# Patient Record
Sex: MALE | Race: WHITE | Employment: UNEMPLOYED | URBAN - METROPOLITAN AREA
[De-identification: names, ages, dates, MRNs, and addresses within clinical notes are randomized per-mention and may not be internally consistent; named-entity substitution may affect disease eponyms.]

---

## 2018-04-27 ENCOUNTER — HOSPITAL ENCOUNTER (INPATIENT)
Age: 53
LOS: 3 days | Discharge: LEFT BEFORE DISCHARGE | DRG: 885 | End: 2018-04-30
Attending: PSYCHIATRY & NEUROLOGY | Admitting: PSYCHIATRY & NEUROLOGY
Payer: MEDICARE

## 2018-04-27 PROBLEM — F20.9 SCHIZOPHRENIA (HCC): Status: ACTIVE | Noted: 2018-04-27

## 2018-04-27 PROCEDURE — 65220000003 HC RM SEMIPRIVATE PSYCH

## 2018-04-27 RX ORDER — OLANZAPINE 5 MG/1
5 TABLET ORAL
Status: DISCONTINUED | OUTPATIENT
Start: 2018-04-27 | End: 2018-04-30 | Stop reason: HOSPADM

## 2018-04-27 RX ORDER — BENZTROPINE MESYLATE 1 MG/ML
2 INJECTION INTRAMUSCULAR; INTRAVENOUS
Status: DISCONTINUED | OUTPATIENT
Start: 2018-04-27 | End: 2018-04-30 | Stop reason: HOSPADM

## 2018-04-27 RX ORDER — ZOLPIDEM TARTRATE 10 MG/1
10 TABLET ORAL
Status: DISCONTINUED | OUTPATIENT
Start: 2018-04-27 | End: 2018-04-30 | Stop reason: HOSPADM

## 2018-04-27 RX ORDER — BENZTROPINE MESYLATE 2 MG/1
2 TABLET ORAL
Status: DISCONTINUED | OUTPATIENT
Start: 2018-04-27 | End: 2018-04-30 | Stop reason: HOSPADM

## 2018-04-27 RX ORDER — ADHESIVE BANDAGE
30 BANDAGE TOPICAL DAILY PRN
Status: DISCONTINUED | OUTPATIENT
Start: 2018-04-27 | End: 2018-04-30 | Stop reason: HOSPADM

## 2018-04-27 RX ORDER — IBUPROFEN 400 MG/1
400 TABLET ORAL
Status: DISCONTINUED | OUTPATIENT
Start: 2018-04-27 | End: 2018-04-30 | Stop reason: HOSPADM

## 2018-04-27 RX ORDER — LORAZEPAM 1 MG/1
1 TABLET ORAL
Status: DISCONTINUED | OUTPATIENT
Start: 2018-04-27 | End: 2018-04-30 | Stop reason: HOSPADM

## 2018-04-27 RX ORDER — IBUPROFEN 200 MG
1 TABLET ORAL
Status: DISCONTINUED | OUTPATIENT
Start: 2018-04-27 | End: 2018-04-30 | Stop reason: HOSPADM

## 2018-04-27 RX ORDER — LORAZEPAM 2 MG/ML
2 INJECTION INTRAMUSCULAR
Status: DISCONTINUED | OUTPATIENT
Start: 2018-04-27 | End: 2018-04-30 | Stop reason: HOSPADM

## 2018-04-27 RX ORDER — ACETAMINOPHEN 325 MG/1
650 TABLET ORAL
Status: DISCONTINUED | OUTPATIENT
Start: 2018-04-27 | End: 2018-04-30 | Stop reason: HOSPADM

## 2018-04-27 NOTE — IP AVS SNAPSHOT
Summary of Care Report The Summary of Care report has been created to help improve care coordination. Users with access to [email or web address] or [address] (Web-based application) may access additional patient information including the Discharge Summary. If you are not currently a [address] user and need more information, please call the number listed below in the [place] [address] section and ask to be connected with Medical Records. Facility Information Name Address Phone [address] [place] [phone] Patient Information Patient Name Sex SUMANTH Valentin ([id number]) Male 1965 Discharge Information Admitting Provider Service Area Unit Radha Milton MD / [address] 3 Acute [place] / [phone] Discharge Provider Discharge Date/Time Discharge Disposition Destination (none) 2018 Afternoon (Pending) AHR (none) Patient Language Language ENGLISH [13] Hospital Problems as of 2018  Never Reviewed Class Noted - Resolved Last Modified POA Active Problems * (Principal)Schizophrenia, paranoid ([facility] [address]  2018 - Present 2018 by Tej Baldwin MD Unknown Entered by Tej Baldwin MD  
  Schizophrenia [facility])  2018 - Present 2018 by Radha Milton MD Unknown Entered by Radha Milton MD  
  
You are allergic to the following No active allergies Current Discharge Medication List  
  
Notice You have not been prescribed any medications. Follow-up Information Follow up With Details Comments Contact Info Julieth Perez today Intake appointment for services.   Due to the limited slots, you may want to arrive by 7:00am. The doors open at 7:30am and you should sign in at the registration desk. [address] [address] Hours: Monday-Friday 8:30am- 12:00PM & 1:00PM-4:30pm  
[phone] (phone) [phone] (fax) 9089 [facility] in 1 week The resource center will provide case management and outreach to homeless individuals. Address: [address] [place] [address],[address] Floor, [place] [address] [address] Phone: [phone] None   None (395) Patient stated that they have no PCP Discharge Instructions DISCHARGE SUMMARY from Nurse PATIENT INSTRUCTIONS: 
What to do at Home: 
Recommended activity: Activity as tolerated *  Please give a list of your current medications to your Primary Care Provider. *  Please update this list whenever your medications are discontinued, doses are 
    changed, or new medications (including over-the-counter products) are added. *  Please carry medication information at all times in case of emergency situations. These are general instructions for a healthy lifestyle: No smoking/ No tobacco products/ Avoid exposure to second hand smoke Surgeon General's Warning:  Quitting smoking now greatly reduces serious risk to your health. Obesity, smoking, and sedentary lifestyle greatly increases your risk for illness A healthy diet, regular physical exercise & weight monitoring are important for maintaining a healthy lifestyle The discharge information has been reviewed with the patient. The patient verbalized understanding. Discharge medications reviewed with the patient and appropriate educational materials and side effects teaching were provided. ___________________________________________________________________________________________________________________________________ If I feel I am at risk of hurting myself or others, I will call the crisis office and speak with a crisis worker who will assist me during my crisis. [address]  [phone] [address] [place] [address] [phone] [facility] 351  [phone] [employer] 72- [phone] [employer]-  [phone] 9 [facility]  [phone] [facility]6 [facility]  [phone] Chart Review Routing History No Routing History on File

## 2018-04-27 NOTE — IP AVS SNAPSHOT
Emilee Wingby 
 
 
 [address] Patient: Tigist Henson MRN: [id number] :1965 About your hospitalization You were admitted on:  2018 You last received care in the:  [address] [place] [address] Gerald You were discharged on:  2018 Why you were hospitalized Your primary diagnosis was:  Schizophrenia, Paranoid (Hcc) Your diagnoses also included:  Schizophrenia (Hcc) Follow-up Information Follow up With Details Comments Contact Info Julieth Perez today Intake appointment for services. Due to the limited slots, you may want to arrive by 7:00am. The doors open at 7:30am and you should sign in at the registration desk. [address] [address] Hours: Monday-Friday 8:30am- 12:00PM & 1:00PM-4:30pm  
[phone] (phone) [phone] (fax) 1104 [facility] in 1 week The resource center will provide case management and outreach to homeless individuals. Address: [address] [place] [address],[place] [address] [address] Phone: [phone] None   None (395) Patient stated that they have no PCP Discharge Orders None A check yumiko indicates which time of day the medication should be taken. My Medications Notice You have not been prescribed any medications. Discharge Instructions DISCHARGE SUMMARY from Nurse PATIENT INSTRUCTIONS: 
What to do at Home: 
Recommended activity: Activity as tolerated *  Please give a list of your current medications to your Primary Care Provider. *  Please update this list whenever your medications are discontinued, doses are 
    changed, or new medications (including over-the-counter products) are added. *  Please carry medication information at all times in case of emergency situations. These are general instructions for a healthy lifestyle: No smoking/ No tobacco products/ Avoid exposure to second hand smoke Surgeon General's Warning:  Quitting smoking now greatly reduces serious risk to your health. Obesity, smoking, and sedentary lifestyle greatly increases your risk for illness A healthy diet, regular physical exercise & weight monitoring are important for maintaining a healthy lifestyle The discharge information has been reviewed with the patient. The patient verbalized understanding. Discharge medications reviewed with the patient and appropriate educational materials and side effects teaching were provided. ___________________________________________________________________________________________________________________________________ If I feel I am at risk of hurting myself or others, I will call the crisis office and speak with a crisis worker who will assist me during my crisis. [place]  [phone] [address] [place] [address] [phone] [place] [address]  [phone] [employer] [address]-   [phone] [employer]-  [phone] 9 [facility]  [phone] [address] [place] [address]  [phone] Introducing [facility]! Janna Astudillo introduces [employer] patient portal. Now you can access parts of your medical record, email your doctor's office, and request medication refills online. 1. In your internet browser, go to [email or web address] 2. Click on the First Time User? Click Here link in the Sign In box. You will see the New Member Sign Up page. 3. Enter your [employer] Access Code exactly as it appears below. You will not need to use this code after youve completed the sign-up process. If you do not sign up before the expiration date, you must request a new code. · [employer] Access Code: [email or web address] Expires: 7/29/2018  1:35 PM 
 
4. Enter the last four digits of your Social Security Number (xxxx) and Date of Birth (mm/dd/yyyy) as indicated and click Submit.  You will be taken to the next sign-up page. 5. Create a Or[employer]ot ID. This will be your [employer] login ID and cannot be changed, so think of one that is secure and easy to remember. 6. Create a [employer] password. You can change your password at any time. 7. Enter your Password Reset Question and Answer. This can be used at a later time if you forget your password. 8. Enter your e-mail address. You will receive e-mail notification when new information is available in [facility] 9. Click Sign Up. You can now view and download portions of your medical record. 10. Click the Download Summary menu link to download a portable copy of your medical information. If you have questions, please visit the Frequently Asked Questions section of the [employer] website. Remember, [employer] is NOT to be used for urgent needs. For medical emergencies, dial 911. Now available from your iPhone and Android! Introducing Leif Kumar As a [facility] patient, I wanted to make you aware of our electronic visit tool called Leif Kumar. [employer] [address] 24/7 allows you to connect within minutes with a medical provider 24 hours a day, seven days a week via a mobile device or tablet or logging into a secure website from your computer. You can access Leif Kumar from anywhere in the [place]. A virtual visit might be right for you when you have a simple condition and feel like you just dont want to get out of bed, or cant get away from work for an appointment, when your regular [facility] provider is not available (evenings, weekends or holidays), or when youre out of town and need minor care. Electronic visits cost only $49 and if the [facility] 24/7 provider determines a prescription is needed to treat your condition, one can be electronically transmitted to a nearby pharmacy*. Please take a moment to enroll today if you have not already done so.   The enrollment process is free and takes just a few minutes. To enroll, please download the [employer] 24/7 caity to your tablet or phone, or visit [email or web address] to enroll on your computer. And, as an [address] [place] [address] patient with a [employer] account, the results of your visits will be scanned into your electronic medical record and your primary care provider will be able to view the scanned results. We urge you to continue to see your regular [employer] provider for your ongoing medical care. And while your primary care provider may not be the one available when you seek a [employer] virtual visit, the peace of mind you get from getting a real diagnosis real time can be priceless. For more information on [employer], view our Frequently Asked Questions (FAQs) at [email or web address]. Sincerely, 
 
Liz Huitron MD 
Chief Medical Officer Eve Narcisa Shaver *:  certain medications cannot be prescribed via [employer] Providers Seen During Your Hospitalization Provider Specialty Primary office phone Taylor Dawkins MD Psychiatry [phone] Your Primary Care Physician (PCP) Primary Care Physician Office Phone Office Fax NONE ** None ** ** None ** You are allergic to the following No active allergies Emergency Contacts Name Discharge Info Relation Home Work Mobile No,One N/A  AT THIS TIME [6] Patient [10] [phone] Patient Belongings The following personal items are in your possession at time of discharge: 
  Dental Appliances: None  Visual Aid: None      Home Medications: None   Jewelry: None  Clothing: Belt, Footwear, Gloves, Jacket/Coat, Pants, Shirt, Socks, Sweater    Other Valuables: Lighter/matches, Money (comment), Pocket knife, Other (comment) (12 cents, 2 Visa debit cards, 3 desi packs, flashlights)  Personal Items Sent to Safe: see valuables card Please provide this summary of care documentation to your next provider. Signatures-by signing, you are acknowledging that this After Visit Summary has been reviewed with you and you have received a copy. Patient Signature:  ____________________________________________________________ Date:  ____________________________________________________________  
  
Shanta Lambing Provider Signature:  ____________________________________________________________ Date:  ____________________________________________________________

## 2018-04-27 NOTE — IP AVS SNAPSHOT
[facility] 
 
 
 [address] Patient: Randy Stewart MRN: [id number] :1965 A check yumiko indicates which time of day the medication should be taken. My Medications Notice You have not been prescribed any medications.

## 2018-04-28 PROBLEM — F20.0 SCHIZOPHRENIA, PARANOID (HCC): Status: ACTIVE | Noted: 2018-04-27

## 2018-04-28 PROCEDURE — 65220000003 HC RM SEMIPRIVATE PSYCH

## 2018-04-28 PROCEDURE — 74011250637 HC RX REV CODE- 250/637: Performed by: FAMILY MEDICINE

## 2018-04-28 RX ORDER — AMLODIPINE BESYLATE 5 MG/1
5 TABLET ORAL DAILY
Status: DISCONTINUED | OUTPATIENT
Start: 2018-04-29 | End: 2018-04-30 | Stop reason: HOSPADM

## 2018-04-28 RX ORDER — AMLODIPINE BESYLATE 5 MG/1
5 TABLET ORAL ONCE
Status: COMPLETED | OUTPATIENT
Start: 2018-04-28 | End: 2018-04-28

## 2018-04-28 RX ADMIN — AMLODIPINE BESYLATE 5 MG: 5 TABLET ORAL at 14:05

## 2018-04-28 NOTE — BH NOTES
GROUP THERAPY PROGRESS NOTE    Bernadine Garcia is participating in [employer].      Group time: 30 minutes    Personal goal for participation:  Unit orientation    Goal orientation: Community    Group therapy participation: active    Therapeutic interventions reviewed and discussed: Yes    Impression of participation: good

## 2018-04-28 NOTE — PROGRESS NOTES
Problem: Altered Thought Process (Adult/Pediatric)  Goal: *STG: Participates in treatment plan  Outcome: Progressing Towards Goal  Pt is visible on the unit. Observed pt responding loudly in his room. Pt blood pressure has been high, called Dr. Jamari Vasquez re the BP and new orders was given. Pt participated in rounds with MD and nurse and became agitated but eventually calmed down. Pt denies any needs at this time. Will continue to monitor pt q15 minutes for safety and support.

## 2018-04-28 NOTE — BH NOTES
LEISURE GROUP THERAPY PROGRESS NOTE    The patient Tea falcon 50 y.o. male is participating in Leisure Group. Group time: 45 minutes    Personal goal for participation: Daily social interactions with other peers & staff.     Goal orientation: Relaxation activities    Group therapy participation: active    Therapeutic interventions reviewed and discussed:  Yes    Impression of participation: Ubaldo Fleischer  4/28/2018  1:17 PM

## 2018-04-28 NOTE — H&P
INITIAL PSYCHIATRIC EVALUATION            IDENTIFICATION:    Patient Name  Malcom Leroy   Date of Birth 1965   [facility] [id number]   Medical Record Number  [id number]      Age  50 y.o. PCP None   Admit date:  4/27/2018    Room Number  308/01  @ [facility]   Date of Service  4/28/2018            HISTORY         REASON FOR HOSPITALIZATION:  CC: \"I would prefer not to have a mental illness doctor service from this place\". Pt admitted under a temporary prison order (TDO) voluntary basis for severe psychosis and melany proving to be an imminent danger to self and others and an inability to care for self. HISTORY OF PRESENT ILLNESS:    The patient, Malcom Leroy, is a 50 y.o. WHITE [place]  male with a past psychiatric history significant for chronic mental illness with diagnosis of Schizophrenia chronic paranoid type, who presents at this time with complaints of (and/or evidence of) the following emotional symptoms: agitation, paranoid behavior and psychosis. poor hygiene,homeless, travelling from state to state. Additional symptomatology include agitation, anger outbursts and problem with medication. The above symptoms have been present for years,worse since last few days. These symptoms are of severe severity. These symptoms are *constant  in nature. The patient's condition has been precipitated by and psychosocial stressors (homeless, poor support  ). Patient's condition made worse by treatment noncompliance. UDS:negative; BAL=0. ALLERGIES: Allergies no known allergies   MEDICATIONS PRIOR TO ADMISSION:   No prescriptions prior to admission. PAST MEDICAL HISTORY:   No past medical history on file. No past surgical history on file. SOCIAL HISTORY:    Social History     Social History    Marital status: SINGLE     Spouse name: N/A    Number of children: N/A    Years of education: N/A     Occupational History    Not on file.      Social History Main Topics    Smoking status: Not on file    Smokeless tobacco: Not on file    Alcohol use Not on file    Drug use: Not on file    Sexual activity: Not on file     Other Topics Concern    Not on file     Social History Narrative      FAMILY HISTORY: History reviewed. No pertinent family history. No family history on file. REVIEW OF SYSTEMS:   Psychological ROS: positive for - mood swings and psychosis   Respiratory ROS: no cough, shortness of breath, or wheezing  Cardiovascular ROS: no chest pain or dyspnea on exertion  Pertinent items are noted in the History of Present Illness. All other Systems reviewed and are considered negative. MENTAL STATUS EXAM & VITALS     MENTAL STATUS EXAM (MSE):    MSE FINDINGS ARE WITHIN NORMAL LIMITS (WNL) UNLESS OTHERWISE STATED BELOW. ( ALL OF THE BELOW CATEGORIES OF THE MSE HAVE BEEN REVIEWED (reviewed 4/28/2018) AND UPDATED AS DEEMED APPROPRIATE )  General Presentation age appropriate, casually dressed, disheveled and poor hygiene, guarded and uncooperative   Orientation oriented to time, place and person   Vital Signs  See below (reviewed 4/28/2018); Vital Signs (BP, Pulse, & Temp) are within normal limits if not listed below.    Gait and Station Stable/steady, no ataxia   Musculoskeletal System No extrapyramidal symptoms (EPS); no abnormal muscular movements or Tardive Dyskinesia (TD); muscle strength and tone are within normal limits   Language No aphasia or dysarthria   Speech:  hyperverbal   Thought Processes illogical; fast rate of thoughts; poor abstract reasoning/computation   Thought Associations loose associations and tangential   Thought Content paranoid delusions   Suicidal Ideations none   Homicidal Ideations none   Mood:  angry, irritable and labile    Affect:  anxious, irritable and labile   Memory recent  intact   Memory remote:  intact   Concentration/Attention:  intact   Fund of Knowledge average   Insight:  poor   Reliability poor   Judgment:  poor VITALS:     Patient Vitals for the past 24 hrs:   Temp Pulse Resp BP SpO2   04/28/18 1210 97.2 °F (36.2 °C) (!) 58 16 (!) 151/110 -   04/28/18 1203 - - - (!) 151/110 -   04/28/18 0616 - 64 - (!) 145/105 -   04/27/18 2111 - 81 - 123/88 -   04/27/18 2013 98.1 °F (36.7 °C) 74 18 (!) 138/117 95 %     Wt Readings from Last 3 Encounters:   No data found for Wt     Temp Readings from Last 3 Encounters:   04/28/18 97.2 °F (36.2 °C)     BP Readings from Last 3 Encounters:   04/28/18 (!) 151/110     Pulse Readings from Last 3 Encounters:   04/28/18 (!) 58            DATA     LABORATORY DATA:  Labs Reviewed - No data to display  No results found for any previous visit. RADIOLOGY REPORTS:  No results found for this or any previous visit. No results found.            MEDICATIONS       ALL MEDICATIONS  Current Facility-Administered Medications   Medication Dose Route Frequency    ziprasidone (GEODON) 20 mg in sterile water (preservative free) 1 mL injection  20 mg IntraMUSCular BID PRN    OLANZapine (ZyPREXA) tablet 5 mg  5 mg Oral Q6H PRN    benztropine (COGENTIN) tablet 2 mg  2 mg Oral BID PRN    benztropine (COGENTIN) injection 2 mg  2 mg IntraMUSCular BID PRN    LORazepam (ATIVAN) injection 2 mg  2 mg IntraMUSCular Q4H PRN    LORazepam (ATIVAN) tablet 1 mg  1 mg Oral Q4H PRN    zolpidem (AMBIEN) tablet 10 mg  10 mg Oral QHS PRN    acetaminophen (TYLENOL) tablet 650 mg  650 mg Oral Q4H PRN    ibuprofen (MOTRIN) tablet 400 mg  400 mg Oral Q8H PRN    magnesium hydroxide (MILK OF MAGNESIA) 400 mg/5 mL oral suspension 30 mL  30 mL Oral DAILY PRN    nicotine (NICODERM CQ) 21 mg/24 hr patch 1 Patch  1 Patch TransDERmal DAILY PRN      SCHEDULED MEDICATIONS  Current Facility-Administered Medications   Medication Dose Route Frequency                ASSESSMENT & PLAN        The patient, Blake Cunningham, is a 50 y.o.  male who presents at this time for treatment of the following diagnoses:  Patient Active Hospital Problem List:   Schizophrenia, paranoid ([facility].) (4/27/2018)    Assessment: Chronic history with acute exacerbation,present with psychosis, paranoia,hyperverbal,delusional, disorganized thoughts process,homeless,internally preoccupied,poor hygiene. Poor insight. wanderer,travelling from state to state,not on any medications and does note want to be on any medications. Plan: Use prn antipsychotic and benzo for psychotic agitations,aggression              Request court order medications. I will continue to monitor blood levels (Depakote, Tegretol, lithium, clozapine---a drug with a narrow therapeutic index= NTI) and associated labs for drug therapy implemented that require intense monitoring for toxicity as deemed appropriate based on current medication side effects and pharmacodynamically determined drug 1/2 lives. A coordinated, multidisplinary treatment team (includes the nurse, unit pharmcist,  and writer) round was conducted for this initial evaluation with the patient present. The following regarding medications was addressed during rounds with patient:   the risks and benefits of the proposed medication. The patient was given the opportunity to ask questions. Informed consent given to the use of the above medications. I will continue to adjust psychiatric and non-psychiatric medications (see above \"medication\" section and orders section for details) as deemed appropriate & based upon diagnoses and response to treatment. I have reviewed admission (and previous/old) labs and medical tests in the EHR and or transferring hospital documents. I will continue to order blood tests/labs and diagnostic tests as deemed appropriate and review results as they become available (see orders for details). I have reviewed old psychiatric and medical records available in the EHR.  I Will order additional psychiatric records from other institutions to further elucidate the nature of patient's psychopathology and review once available. I will gather additional collateral information from friends, family and o/p treatment team to further elucidate the nature of patient's psychopathology and baselline level of psychiatric functioning.       ESTIMATED LENGTH OF STAY:    TBA        STRENGTHS:  Exercising self-direction/Resourceful                                        SIGNED:    Kathie Marks MD  4/28/2018

## 2018-04-28 NOTE — BH NOTES
Pt is a 50 yrs old male admitted on TDO, medically cleared at [facility]. Reportedly [place] police were called due to  pt was yelling at people and cars outside a store. Pt had history of Schizophrenia, not taking his medication and unable to care of himself, not showered in 2 weeks, not eating and is homeless. Pt said that he is from [place] has been travelling to [place] and he came to [facility] just to travel. UDS and BAL negative. On admission pt is uncooperative, very hesitant to share his mental health treatment, he said \" I don't want you all to  me based on my information. He is with poor hygiene, pressured speech, loud, refusing to take any medication, admits hearing voices, denies SI / HI and contracted for safety. Body search done by Imtiaz ALYT, Gracie ALYT and writer is unremarkable. Q 15 minutes monitoring initiated for the safety.

## 2018-04-29 LAB
CHOLEST SERPL-MCNC: 113 MG/DL
HDLC SERPL-MCNC: 45 MG/DL
HDLC SERPL: 2.5 {RATIO} (ref 0–5)
LDLC SERPL CALC-MCNC: 58.2 MG/DL (ref 0–100)
LIPID PROFILE,FLP: NORMAL
TRIGL SERPL-MCNC: 49 MG/DL (ref ?–150)
TSH SERPL DL<=0.05 MIU/L-ACNC: 1.02 UIU/ML (ref 0.36–3.74)
VLDLC SERPL CALC-MCNC: 9.8 MG/DL

## 2018-04-29 PROCEDURE — 65220000001 HC RM PRIVATE PSYCH

## 2018-04-29 PROCEDURE — 36415 COLL VENOUS BLD VENIPUNCTURE: CPT

## 2018-04-29 PROCEDURE — 80061 LIPID PANEL: CPT

## 2018-04-29 PROCEDURE — 84443 ASSAY THYROID STIM HORMONE: CPT

## 2018-04-29 PROCEDURE — 74011250637 HC RX REV CODE- 250/637: Performed by: FAMILY MEDICINE

## 2018-04-29 RX ADMIN — AMLODIPINE BESYLATE 5 MG: 5 TABLET ORAL at 08:51

## 2018-04-29 NOTE — BH NOTES
Pt spent the shift in room at times responding loudly to internal stimuli. Pt is meal complaint, however, he states he does not need medication. The med nursed encouraged him to be compliant and was successful. Pt express his feelings about being in the hospital, stating he wants to live his life the way he chooses and, the hospital is not his choice. Pt will continue to be monitored for safety.

## 2018-04-29 NOTE — CONSULTS
Medical Consult for [facility] Patient    Consult H&P   dictated, see patient chart    Impression:    Roland Fierro a 50 y.o. male with past medical history of schizophrenia presents with behavioral health problems of psychosis was admitted for further psychiatric evaluation and treatment. Plan:   1. Psychiatry to manage mental health issues  2. Continuing home medications once confirmed by nursing. 3. Pt bp is noted to elevated. Will treat accordingling  4. Otherwise, medically stable at this time, will follow up as needed. 5. No VTE prophylaxis indicated or necessary at this time.      Thank you  Brigid Rosa MD  4/29/2018, 6:51 AM

## 2018-04-29 NOTE — PROGRESS NOTES
PSYCHIATRIC PROGRESS NOTE         Patient Name  Bartolome Fisher   Date of Birth 1965   [facility] [id number]   Medical Record Number  [id number]      Age  50 y.o. PCP None   Admit date:  4/27/2018    Room Number  307/01  @ [facility]   Date of Service  4/29/2018          PSYCHOTHERAPY SESSION NOTE:  Length of psychotherapy session: 15 minutes    Main condition/diagnosis/issues treated during session today, 4/29/2018 : compliance     I employed Cognitive Behavioral therapy techniques, Reality-Oriented psychotherapy, as well as supportive psychotherapy in regards to various ongoing psychosocial stressors, including the following: pre-admission and current problems; housing issues; occupational issues; academic issues; legal issues; medical issues; and stress of hospitalization. Interpersonal relationship issues and psychodynamic conflicts explored. Attempts made to alleviate maladaptive patterns. We, also, worked on issues of denial & effects of substance dependency/use     Overall, patient is not progressing    Treatment Plan Update (reviewed an updated 4/29/2018) : I will modify psychotherapy tx plan by implementing more stress management strategies, building upon cognitive behavioral techniques, increasing coping skills, as well as shoring up psychological defenses). An extended energy and skill set was needed to engage pt in psychotherapy due to some of the following: resistiveness, complexity, negativity, confrontational nature, hostile behaviors, and/or severe abnormalities in thought processes/psychosis resulting in the loss of expressive/receptive language communication skills. E & M PROGRESS NOTE:         HISTORY         HISTORY OF PRESENT ILLNESS/INTERVAL HISTORY:  (reviewed/updated 4/29/2018). CC: \"I would prefer not to have a mental illness doctor service from this place\".  Pt admitted under a temporary half-way order (TDO) voluntary basis for severe psychosis and melany proving to be an imminent danger to self and others and an inability to care for self. HISTORY OF PRESENT ILLNESS:    The patient, Tigist Henson, is a 50 y.o. WHITE [place]  male with a past psychiatric history significant for chronic mental illness with diagnosis of Schizophrenia chronic paranoid type, who presents at this time with complaints of (and/or evidence of) the following emotional symptoms: agitation, paranoid behavior and psychosis. poor hygiene,homeless, travelling from [facility] to [facility]. Additional symptomatology include agitation, anger outbursts and problem with medication. The above symptoms have been present for years,worse since last few days. These symptoms are of severe severity. These symptoms are *constant  in nature. The patient's condition has been precipitated by and psychosocial stressors (homeless, poor support  ). Patient's condition made worse by treatment noncompliance. UDS:negative; BAL=0. Tigist Henson presents/reports/evidences the following emotional symptoms today, 4/29/2018:psychosis. The above symptoms have been present for years . These symptoms are of moderate in severity. The symptoms are intermittent/ fleeting in nature. Additional symptomatology and features include paranoid, psychotic,noncomplaint with medications, gets upset when medications discussed, poor insight,he is a wanderer, homeless,travel state to [facility]. Since admission he has not received no prn medications. He believes that he is doing fine for years and does not want to take medications. SIDE EFFECTS: (reviewed/updated 4/29/2018)  None reported or admitted to. No noted toxicity with use of Depakote/Tegretol/lithium/Clozaril/TCAs   ALLERGIES:(reviewed/updated 4/29/2018)  No Known Allergies   MEDICATIONS PRIOR TO ADMISSION:(reviewed/updated 4/29/2018)  No prescriptions prior to admission.       PAST MEDICAL HISTORY: Past medical history from the initial psychiatric evaluation has been reviewed (reviewed/updated 4/29/2018) with no additional updates (I asked patient and no additional past medical history provided). No past medical history on file. No past surgical history on file. SOCIAL HISTORY: Social history from the initial psychiatric evaluation has been reviewed (reviewed/updated 4/29/2018) with no additional updates (I asked patient and no additional social history provided). Social History     Social History    Marital status: SINGLE     Spouse name: N/A    Number of children: N/A    Years of education: N/A     Occupational History    Not on file. Social History Main Topics    Smoking status: Not on file    Smokeless tobacco: Not on file    Alcohol use Not on file    Drug use: Not on file    Sexual activity: Not on file     Other Topics Concern    Not on file     Social History Narrative      FAMILY HISTORY: Family history from the initial psychiatric evaluation has been reviewed (reviewed/updated 4/29/2018) with no additional updates (I asked patient and no additional family history provided). No family history on file. REVIEW OF SYSTEMS: (reviewed/updated 4/29/2018)  Appetite:improved   Sleep: good   All other Review of Systems: Psychological ROS: positive for - mood swings and Psychosis   Respiratory ROS: no cough, shortness of breath, or wheezing  Cardiovascular ROS: no chest pain or dyspnea on exertion         [address] (MSE):    MSE FINDINGS ARE WITHIN NORMAL LIMITS (WNL) UNLESS OTHERWISE STATED BELOW. ( ALL OF THE BELOW CATEGORIES OF THE MSE HAVE BEEN REVIEWED (reviewed 4/29/2018) AND UPDATED AS DEEMED APPROPRIATE )  General Presentation age appropriate and casually dressed, guarded and uncooperative   Orientation oriented to time, place and person   Vital Signs  See below (reviewed 4/29/2018); Vital Signs (BP, Pulse, & Temp) are within normal limits if not listed below.    Gait and Station [place], no ataxia   Musculoskeletal System No extrapyramidal symptoms (EPS); no abnormal muscular movements or Tardive Dyskinesia (TD); muscle strength and tone are within normal limits   Language No aphasia or dysarthria   Speech:  normal pitch and normal volume   Thought Processes illogical; fast rate of thoughts; poor abstract reasoning/computation   Thought Associations tangential   Thought Content paranoid delusions   Suicidal Ideations none   Homicidal Ideations none   Mood:  irritable and labile    Affect:  hostile, irritable and labile   Memory recent  intact   Memory remote:  intact   Concentration/Attention:  hypervigilance   Fund of Knowledge average   Insight:  poor   Reliability poor   Judgment:  poor          VITALS:     Patient Vitals for the past 24 hrs:   Temp Pulse Resp BP   04/29/18 1241 98.1 °F (36.7 °C) 72 16 121/85   04/29/18 0600 98.1 °F (36.7 °C) (!) 59 18 136/85   04/28/18 1817 97.9 °F (36.6 °C) 64 16 (!) 140/98     Wt Readings from Last 3 Encounters:   No data found for Wt     Temp Readings from Last 3 Encounters:   04/29/18 98.1 °F (36.7 °C)     BP Readings from Last 3 Encounters:   04/29/18 121/85     Pulse Readings from Last 3 Encounters:   04/29/18 72            DATA     LABORATORY DATA:(reviewed/updated 4/29/2018)  Recent Results (from the past 24 hour(s))   LIPID PANEL    Collection Time: 04/29/18  4:40 AM   Result Value Ref Range    LIPID PROFILE          Cholesterol, total 113 <200 MG/DL    Triglyceride 49 <150 MG/DL    HDL Cholesterol 45 MG/DL    LDL, calculated 58.2 0 - 100 MG/DL    VLDL, calculated 9.8 MG/DL    CHOL/HDL Ratio 2.5 0 - 5.0     TSH 3RD GENERATION    Collection Time: 04/29/18  4:40 AM   Result Value Ref Range    TSH 1.02 0.36 - 3.74 uIU/mL     No results found for: VALF2, VALAC, VALP, VALPR, DS6, CRBAM, CRBAMP, CARB2, XCRBAM  No results found for: LITHM   RADIOLOGY REPORTS:(reviewed/updated 4/29/2018)  No results found.        MEDICATIONS     ALL MEDICATIONS:   Current Facility-Administered Medications   Medication Dose Route Frequency    amLODIPine (NORVASC) tablet 5 mg  5 mg Oral DAILY    ziprasidone (GEODON) 20 mg in sterile water (preservative free) 1 mL injection  20 mg IntraMUSCular BID PRN    OLANZapine (ZyPREXA) tablet 5 mg  5 mg Oral Q6H PRN    benztropine (COGENTIN) tablet 2 mg  2 mg Oral BID PRN    benztropine (COGENTIN) injection 2 mg  2 mg IntraMUSCular BID PRN    LORazepam (ATIVAN) injection 2 mg  2 mg IntraMUSCular Q4H PRN    LORazepam (ATIVAN) tablet 1 mg  1 mg Oral Q4H PRN    zolpidem (AMBIEN) tablet 10 mg  10 mg Oral QHS PRN    acetaminophen (TYLENOL) tablet 650 mg  650 mg Oral Q4H PRN    ibuprofen (MOTRIN) tablet 400 mg  400 mg Oral Q8H PRN    magnesium hydroxide (MILK OF MAGNESIA) 400 mg/5 mL oral suspension 30 mL  30 mL Oral DAILY PRN    nicotine (NICODERM CQ) 21 mg/24 hr patch 1 Patch  1 Patch TransDERmal DAILY PRN      SCHEDULED MEDICATIONS:   Current Facility-Administered Medications   Medication Dose Route Frequency    amLODIPine (NORVASC) tablet 5 mg  5 mg Oral DAILY          ASSESSMENT & PLAN     DIAGNOSES REQUIRING ACTIVE TREATMENT AND MONITORING: (reviewed/updated 4/29/2018)  Patient Active Hospital Problem List:   Schizophrenia, paranoid ([facility] [address] (4/27/2018)    Assessment: Chronic history with acute exacerbation,present with psychosis, paranoia,hyperverbal,delusional, disorganized thoughts process,homeless,internally preoccupied,poor hygiene. Poor insight. wanderer,travelling from state to state,not on any medications and does note want to be on any medications. Plan: Use prn antipsychotic and benzo for psychotic agitations,aggression              Request court order medications. 04/29/18: Patient does not allow to start any medications, he gets upset, irritable, verbally hostile when medications discussed. He will requires court order medications request.         I will continue to monitor blood levels (Depakote, Tegretol, lithium, clozapine---a drug with a narrow therapeutic index= NTI) and associated labs for drug therapy implemented that require intense monitoring for toxicity as deemed appropriate based on current medication side effects and pharmacodynamically determined drug 1/2 lives. In summary, Anthony Mohan, is a 50 y.o.  male who presents with a severe exacerbation of the principal diagnosis of Schizophrenia, paranoid ([address]  Patient's condition is worsening/not improving/not stable improving. Patient requires continued inpatient hospitalization for further stabilization, safety monitoring and medication management. I will continue to coordinate the provision of individual, milieu, occupational, group, and substance abuse therapies to address target symptoms/diagnoses as deemed appropriate for the individual patient. A coordinated, multidisplinary treatment team round was conducted with the patient (this team consists of the nurse, psychiatric unit pharmcist,  and writer). Complete current electronic health record for patient has been reviewed today including consultant notes, ancillary staff notes, nurses and psychiatric tech notes. Suicide risk assessment completed and patient deemed to be of low risk for suicide at this time. The following regarding medications was addressed during rounds with patient:   the risks and benefits of the proposed medication. The patient was given the opportunity to ask questions. Informed consent given to the use of the above medications. Will continue to adjust psychiatric and non-psychiatric medications (see above \"medication\" section and orders section for details) as deemed appropriate & based upon diagnoses and response to treatment. I will continue to order blood tests/labs and diagnostic tests as deemed appropriate and review results as they become available (see orders for details and above listed lab/test results).     I will order psychiatric records from previous [facility] hospitals to further elucidate the nature of patient's psychopathology and review once available. I will gather additional collateral information from friends, family and o/p treatment team to further elucidate the nature of patient's psychopathology and baselline level of psychiatric functioning. I certify that this patient's inpatient psychiatric hospital services furnished since the previous certification were, and continue to be, required for treatment that could reasonably be expected to improve the patient's condition, or for diagnostic study, and that the patient continues to need, on a daily basis, active treatment furnished directly by or requiring the supervision of inpatient psychiatric facility personnel. In addition, the hospital records show that services furnished were intensive treatment services, admission or related services, or equivalent services.     EXPECTED DISCHARGE DATE/DAY: TBD     DISPOSITION: Home       Signed By:   Igor Graff MD  4/29/2018

## 2018-04-29 NOTE — CONSULTS
[address]  MR#: [id number]  : 1965  ACCOUNT #: [de-identified]   DATE OF SERVICE: 2018    REFERRING PHYSICIAN:  Kd Stubbs MD     REASON FOR CONSULTATION:  Medical evaluation for psychiatric admission. CHIEF COMPLAINT:  Psychosis. HISTORY OF PRESENT ILLNESS:  A 61-year-old presents psychotic, requiring further psychiatric evaluation and treatment. The patient apparently is homeless, was in transit from [place] to [place] when he stopped in [facility] and began yelling at people. He was brought in for psychosis, transferred from [facility]. PAST MEDICAL HISTORY:  Schizophrenia. PAST SURGICAL HISTORY:  None. ALLERGIES:  None. MEDICATIONS:  None. SOCIAL HISTORY:  Does smoke cigarettes. Denies any alcohol or illicit drug use. Single, has 1 child, currently not working. PHYSICAL EXAMINATION:  VITAL SIGNS:  Temperature 98.1, blood pressure 134/85, pulse 59, respiration 18. GENERAL:  Pleasant, no acute distress. HEENT:.  Oropharynx is clear. NECK:  Supple. LUNGS:  Clear to auscultation, no wheezing, rales, rhonchi. HEART:  Regular rate. No murmurs, gallops or rubs. ABDOMEN:  Soft, nontender, nondistended. Active bowel sounds are normal.  EXTREMITIES:  No cyanosis, clubbing, edema. LABORATORY DATA:  Urine drug screen was negative. Alcohol level was negative. ASSESSMENT:  A 61-year-old male with past medical history of schizophrenia who presents with psychosis, admitted for further psychiatric evaluation. PLAN:  1. Psychiatric management of mental health issues. Continue home meds once confirmed by nurses. 2.  Medically stable at this time. We will follow up on a p.r.n. basis. 3.  No VTE prophylaxis indicated. Thank you for this consult.       Taco Jay MD DC / Justin Díaz  D: 2018 16:27     T: 2018 17:44  JOB #: [id number]

## 2018-04-29 NOTE — PROGRESS NOTES
Problem: Altered Thought Process (Adult/Pediatric)  Goal: *STG: Remains safe in hospital  [address] [facility] [address]  Master Treatment Plan for Gabby Vega    Date Treatment Plan Initiated: 07/27/18    Treatment Plan Modalities:  Type of Modality Amount  (x minutes) Frequency (x/week) Duration (x days) Name of Responsible Staff  [address] [facility] meetings to encourage peer interactions 15 7 1     Group psychotherapy to assist in building coping skills and internal controls 60 7 1 Calvin More  Therapeutic activity groups to build coping skills 60 7 1 Calvin More  Psychoeducation in group setting to address:  Medication education   15 7 1   Coping skills        Relaxation techniques        Symptom management        Discharge planning   61 2 [facility] [address]   60 1 1 volunteer  Recovery/AA/NA      volunteer  Physician medication management   15 7 1   Family meeting/discharge planning   15 2 [address] [facility]                               Outcome: Progressing Towards Goal  Pt slept for 6 hours and had no signs or symptoms of distress. Pt had no issues through the night. Q15 safety monitoring rounds continued.

## 2018-04-29 NOTE — BH NOTES
GROUP THERAPY PROGRESS NOTE    The patient Lizeth Wyman is participating in [employer]. Group time: 30 minutes    Personal goal for participation: to orient the patient to the unit.     Goal orientation: successful adoption of unit rules    Group therapy participation: active    Therapeutic interventions reviewed and discussed: Yes    Impression of participation:     Carole Hurt  4/29/2018 8:51 AM

## 2018-04-30 VITALS
HEART RATE: 66 BPM | DIASTOLIC BLOOD PRESSURE: 98 MMHG | OXYGEN SATURATION: 97 % | TEMPERATURE: 97.3 F | RESPIRATION RATE: 16 BRPM | SYSTOLIC BLOOD PRESSURE: 145 MMHG

## 2018-04-30 PROBLEM — F20.9 SCHIZOPHRENIA (HCC): Status: ACTIVE | Noted: 2018-04-30

## 2018-04-30 PROCEDURE — 74011250637 HC RX REV CODE- 250/637: Performed by: FAMILY MEDICINE

## 2018-04-30 RX ADMIN — AMLODIPINE BESYLATE 5 MG: 5 TABLET ORAL at 08:16

## 2018-04-30 NOTE — BH NOTES
Pt is AMA discharge. Dismissed by the courts. Pt is alert and oriented x person, place and time. Pt denies any SI/HI or AV hallucinations. Pt denies any depression. Pt plans to return home. Discharge information reviewed with patient. Pt verbalizes understanding. Pt's belongings/valuables returned. Pt to be transported home by bus.

## 2018-04-30 NOTE — BH NOTES
PSYCHOSOCIAL ASSESSMENT  :Patient identifying info:  Stephane Mcmillan is a 50 y.o., male admitted 4/27/2018  7:41 PM     Presenting problem and precipitating factors: Pt was brought in under TDO status and was dismissed from the court and will be leaving [place]. Pt appears paranoid, psychotic, noncomplaint with medications, gets upset when medications discussed, poor insight, he is a wanderer,  Homeless - traveling  state to [facility] ( [facility] and now in [place]). Pt states that police picked him up because he was walking around without his backpack and . Pt states he is living near [place] [address] behind [email or web address] on the [facility] [place]. 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 Pt states this  can schedule follow up appointments and give resources but he will not use them. Mental status assessment:  Pt has odd affect, hyper verbal and tangential. Pt does report \"talking to the voices\" but denies command hallucinations. He is guarded and easily gets irritable when discussing medication or mental health. Poor insight and judgment. Current psychiatric providers and contact info: None    Previous psychiatric services/providers and response to treatment: None reported. Family history of mental illness : Unknown. Substance abuse history:  Unknown - results not back. Social History   Substance Use Topics    Smoking status: Not on file    Smokeless tobacco: Not on file    Alcohol use Not on file       Family constellation: Pt reports family in [place] that he does not contact. Is significant other involved? N/A. Describe support system: Pt states he does not have support. Describe living arrangements and home environment:  Pt is chronically homeless and is currently camping out behind [address] in [facility] end.     Health issues:   Hospital Problems  Never Reviewed          Codes Class Noted POA    Schizophrenia [facility]) ICD-10-CM: F20.9  ICD-9-CM: 295.90  2018 Unknown        * (Principal)Schizophrenia, paranoid ([facility] ICD-10-CM: F20.0  ICD-9-CM: 295.30  2018 Unknown              Trauma history: Unknown at this time. Legal issues: Reported no pending charges. History of  service: None reported. Financial status: None. Druze/cultural factors: None expressed at this time. Education/work history: Unknown at this time. Have you been licensed as a dariel care professional (current or ): No.  Leisure and recreation preferences:  Spending time outdoors. Describe coping skills: Limited.      Jerrod Castañeda  2018

## 2018-04-30 NOTE — BH NOTES
GROUP THERAPY PROGRESS NOTE    Zoila Benitez is participating in [employer].      Group time: 30 minutes    Personal goal for participation:  Unit orientation    Goal orientation: Community    Group therapy participation: active    Therapeutic interventions reviewed and discussed: Yes    Impression of participation: good

## 2018-04-30 NOTE — DISCHARGE SUMMARY
Pt was released from TDO hearing today and does not want to stay inpatient. Pt is requesting dc AMA- aware of the risks and has been moving from state to state without any medication tx. Will dc AMA. Please see Leny Friends noted for further information/ diagnoses and tx planning.

## 2018-04-30 NOTE — BH NOTES
Behavioral Health Transition Record to Provider    Patient Name: Lilian Caldera  YOB: 1965  Medical Record Number: [id number]  Date of Admission: 4/27/2018  Date of Discharge: 4/30/2018    Attending Provider: Porfirio Brito MD  Discharging Provider: Porfirio Brito. MD  To contact this individual call [phone] and ask the  to page. If unavailable, ask to be transferred to [address] [place] [address] Provider on call. [facility] Provider will be available on call 24/7 and during holidays. Primary Care Provider: None    No Known Allergies    Reason for Admission: Pt was brought in under TDO status and was dismissed from the court and will be leaving AMA. Pt appears paranoid, psychotic, noncomplaint with medications, gets upset when medications discussed, poor insight, he is a wanderer,  Homeless - traveling  state to [facility] ( [facility] and now in [place]). Pt states that police picked him up because he was walking around without his backpack and talking to self. Pt states he is living near [place] ponds behind [employer] [address] [employer] on the [address] [place] [address] S end. 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 Admission Diagnosis: schizophrenia  Schizophrenia ([facility] [address]  Schizophrenia ([facility] [address]    * No surgery found *    Results for orders placed or performed during the hospital encounter of 04/27/18   LIPID PANEL   Result Value Ref Range    LIPID PROFILE          Cholesterol, total 113 <200 MG/DL    Triglyceride 49 <150 MG/DL    HDL Cholesterol 45 MG/DL    LDL, calculated 58.2 0 - 100 MG/DL    VLDL, calculated 9.8 MG/DL    CHOL/HDL Ratio 2.5 0 - 5.0     TSH 3RD GENERATION   Result Value Ref Range    TSH 1.02 0.36 - 3.74 uIU/mL       Immunizations administered during this encounter: There is no immunization history on file for this patient.     Screening for Metabolic Disorders for Patients on Antipsychotic Medications  (Data obtained from the EMR)    Estimated Body Mass Index  There is no height or weight on file to calculate BMI. Vital Signs/Blood Pressure  Visit Vitals    BP (!) 145/98 (BP 1 Location: Left arm, BP Patient Position: Sitting)    Pulse 66    Temp 97.3 °F (36.3 °C)    Resp 16    SpO2 97%       Blood Glucose/Hemoglobin A1c  No results found for: GLU, GLUCPOC    No results found for: HBA1C, HGBE8, LTT7YIJZ     Lipid Panel  Lab Results   Component Value Date/Time    Cholesterol, total 113 04/29/2018 04:40 AM    HDL Cholesterol 45 04/29/2018 04:40 AM    LDL, calculated 58.2 04/29/2018 04:40 AM    Triglyceride 49 04/29/2018 04:40 AM    CHOL/HDL Ratio 2.5 04/29/2018 04:40 AM        Discharge Diagnosis: Please refer to physician's discharge summary. Discharge Plan: Pt states this  can schedule follow up appointments and give resources but he will not use them. [facility]. Pt was given bus pass. Pt has odd affect, hyper verbal and tangential. Pt does report \"talking to the voices\" but denies command hallucinations. He is guarded and easily gets irritable when discussing medication or mental health. Poor insight and judgment. Leaving AMA after released by court. Discharge Medication List and Instructions: There are no discharge medications for this patient. Unresulted Labs     None        To obtain results of studies pending at discharge, please contact N/A. Follow-up Information     Follow up With Details Comments [id number] Go today Intake appointment for services. Due to the limited slots, you may want to arrive by 7:00am. The doors open at 7:30am and you should sign in at the registration desk.   [facility] [id number]  Hours: Monday-Friday 8:30am- 12:00PM & 1:00PM-4:30pm   [phone] (phone)  [phone] (fax)      [facility] in 1 week The resource center will provide case management and outreach to homeless individuals. Address: [address] [place] [address],[address] [place] [address] [address]  Phone: [phone]      None   None (401) Patient stated that they have no PCP                Advanced Directive:   Does the patient have an appointed surrogate decision maker? Unknown   Does the patient have a Medical Advance Directive? Unknown   Does the patient have a Psychiatric Advance Directive? Unknown   If the patient does not have a surrogate or Medical Advance Directive AND Psychiatric Advance Directive, the patient was offered information on these advance directives Unknown      Patient Instructions: Please continue all medications until otherwise directed by physician. Tobacco Cessation Discharge Plan:   Is the patient a smoker and needs referral for smoking cessation? No  Patient referred to the following for smoking cessation with an appointment? No   Patient was offered medication to assist with smoking cessation at discharge? No  Was education for smoking cessation added to the discharge instructions? No    Alcohol/Substance Abuse Discharge Plan:   Does the patient have a history of substance/alcohol abuse and requires a referral for treatment? Yes  Patient referred to the following for substance/alcohol abuse treatment with an appointment? Yes  Patient was offered medication to assist with alcohol cessation at discharge? No  Was education for substance/alcohol abuse added to discharge instructions? Yes    Patient discharged to Home; provided to the patient/caregiver either in hard copy or electronically. Continuing care paperwork was faxed to community mental health providers.

## 2018-04-30 NOTE — BH NOTES
PSYCHIATRIC PROGRESS NOTE         Patient Name  Christina Willoughby   Date of Birth 1965   [facility] [id number]   Medical Record Number  [id number]      Age  50 y.o. PCP None   Admit date:  4/27/2018    Room Number  307/01  @ [facility]   Date of Service  4/30/2018          PSYCHOTHERAPY SESSION NOTE:  Length of psychotherapy session: 15 minutes    Main condition/diagnosis/issues treated during session today, 4/30/2018 : compliance , psychosis and mood swings    I employed Cognitive Behavioral therapy techniques, Reality-Oriented psychotherapy, as well as supportive psychotherapy in regards to various ongoing psychosocial stressors, including the following: pre-admission and current problems; housing issues; occupational issues; academic issues; legal issues; medical issues; and stress of hospitalization. Interpersonal relationship issues and psychodynamic conflicts explored. Attempts made to alleviate maladaptive patterns. We, also, worked on issues of denial & effects of substance dependency/use     Overall, patient is not progressing    Treatment Plan Update (reviewed an updated 4/30/2018) : I will modify psychotherapy tx plan by implementing more stress management strategies, building upon cognitive behavioral techniques, increasing coping skills, as well as shoring up psychological defenses). An extended energy and skill set was needed to engage pt in psychotherapy due to some of the following: resistiveness, complexity, negativity, confrontational nature, hostile behaviors, and/or severe abnormalities in thought processes/psychosis resulting in the loss of expressive/receptive language communication skills. E & M PROGRESS NOTE:         HISTORY         HISTORY OF PRESENT ILLNESS/INTERVAL HISTORY:  (reviewed/updated 4/30/2018). CC: \"I would prefer not to have a mental illness doctor service from this place\".  Pt admitted under a temporary halfway order (TDO) voluntary basis for severe psychosis and melany proving to be an imminent danger to self and others and an inability to care for self. HISTORY OF PRESENT ILLNESS:    The patient, Bernadine Garcia, is a 50 y.o. WHITE [place]  male with a past psychiatric history significant for chronic mental illness with diagnosis of Schizophrenia chronic paranoid type, who presents at this time with complaints of (and/or evidence of) the following emotional symptoms: agitation, paranoid behavior and psychosis. poor hygiene,homeless, travelling from [facility] [place] [facility]. Additional symptomatology include agitation, anger outbursts and problem with medication. The above symptoms have been present for years,worse since last few days. These symptoms are of severe severity. These symptoms are *constant  in nature. The patient's condition has been precipitated by and psychosocial stressors (homeless, poor support  ). Patient's condition made worse by treatment noncompliance. UDS:negative; BAL=0. Bernadine Garcia presents/reports/evidences the following emotional symptoms today, 4/30/2018:psychosis. The above symptoms have been present for years . These symptoms are of moderate in severity. The symptoms are intermittent/ fleeting in nature. Additional symptomatology and features include paranoid, psychotic,noncomplaint with medications, gets upset when medications discussed, poor insight,he is a wanderer, homeless,travel [facility] to [facility]. Since admission he has not received no prn medications. He believes that he is doing fine for years and does not want to take medications. 4/30- pt has odd affect, hyper verbal and tangential. He does report \"talking to the voices\" but denies command hallucination. He has been travelling from [facility] to [facility] ( [facility] and now in [place]). He is guarded and easily gets irritable when discussing medication or mental health. Appear paranoid. No physical aggression.  Poor insight      SIDE EFFECTS: (reviewed/updated 4/30/2018)  None reported or admitted to. No noted toxicity with use of    ALLERGIES:(reviewed/updated 4/30/2018)  No Known Allergies   MEDICATIONS PRIOR TO ADMISSION:(reviewed/updated 4/30/2018)  No prescriptions prior to admission. PAST MEDICAL HISTORY: Past medical history from the initial psychiatric evaluation has been reviewed (reviewed/updated 4/30/2018) with no additional updates (I asked patient and no additional past medical history provided). No past medical history on file. No past surgical history on file. SOCIAL HISTORY: Social history from the initial psychiatric evaluation has been reviewed (reviewed/updated 4/30/2018) with no additional updates (I asked patient and no additional social history provided). Social History     Social History    Marital status: SINGLE     Spouse name: N/A    Number of children: N/A    Years of education: N/A     Occupational History    Not on file. Social History Main Topics    Smoking status: Not on file    Smokeless tobacco: Not on file    Alcohol use Not on file    Drug use: Not on file    Sexual activity: Not on file     Other Topics Concern    Not on file     Social History Narrative      FAMILY HISTORY: Family history from the initial psychiatric evaluation has been reviewed (reviewed/updated 4/30/2018) with no additional updates (I asked patient and no additional family history provided). No family history on file.     REVIEW OF SYSTEMS: (reviewed/updated 4/30/2018)  Appetite:improved   Sleep: good   All other Review of Systems: Psychological ROS: positive for - mood swings and Psychosis   Respiratory ROS: no cough, shortness of breath, or wheezing  Cardiovascular ROS: no chest pain or dyspnea on exertion         MENTAL STATUS EXAM & VITALS     MENTAL STATUS EXAM (MSE):    MSE FINDINGS ARE WITHIN NORMAL LIMITS (WNL) UNLESS OTHERWISE STATED BELOW. ( ALL OF THE BELOW CATEGORIES OF THE MSE HAVE BEEN REVIEWED (reviewed 4/30/2018) AND UPDATED AS DEEMED APPROPRIATE )  General Presentation age appropriate and casually dressed, guarded and uncooperative   Orientation oriented to time, place and person   Vital Signs  See below (reviewed 4/30/2018); Vital Signs (BP, Pulse, & Temp) are within normal limits if not listed below. Gait and Station Stable/steady, no ataxia   Musculoskeletal System No extrapyramidal symptoms (EPS); no abnormal muscular movements or Tardive Dyskinesia (TD); muscle strength and tone are within normal limits   Language No aphasia or dysarthria   Speech:  normal pitch and normal volume   Thought Processes illogical; fast rate of thoughts; poor abstract reasoning/computation   Thought Associations tangential   Thought Content paranoid delusions   Suicidal Ideations none   Homicidal Ideations none   Mood:  irritable and labile    Affect:  hostile, irritable and labile   Memory recent  intact   Memory remote:  intact   Concentration/Attention:  hypervigilance   Fund of Knowledge average   Insight:  poor   Reliability poor   Judgment:  poor          VITALS:     Patient Vitals for the past 24 hrs:   Temp Pulse Resp BP   04/30/18 0630 97.3 °F (36.3 °C) (!) 51 18 125/82   04/29/18 1241 98.1 °F (36.7 °C) 72 16 121/85     Wt Readings from Last 3 Encounters:   No data found for Wt     Temp Readings from Last 3 Encounters:   04/30/18 97.3 °F (36.3 °C)     BP Readings from Last 3 Encounters:   04/30/18 125/82     Pulse Readings from Last 3 Encounters:   04/30/18 (!) 51            DATA     LABORATORY DATA:(reviewed/updated 4/30/2018)  No results found for this or any previous visit (from the past 24 hour(s)). No results found for: VALF2, VALAC, VALP, VALPR, DS6, CRBAM, CRBAMP, CARB2, XCRBAM  No results found for: LITHM   RADIOLOGY REPORTS:(reviewed/updated 4/30/2018)  No results found.        MEDICATIONS     ALL MEDICATIONS:   Current Facility-Administered Medications   Medication Dose Route Frequency    amLODIPine (NORVASC) tablet 5 mg  5 mg Oral DAILY    ziprasidone (GEODON) 20 mg in sterile water (preservative free) 1 mL injection  20 mg IntraMUSCular BID PRN    OLANZapine (ZyPREXA) tablet 5 mg  5 mg Oral Q6H PRN    benztropine (COGENTIN) tablet 2 mg  2 mg Oral BID PRN    benztropine (COGENTIN) injection 2 mg  2 mg IntraMUSCular BID PRN    LORazepam (ATIVAN) injection 2 mg  2 mg IntraMUSCular Q4H PRN    LORazepam (ATIVAN) tablet 1 mg  1 mg Oral Q4H PRN    zolpidem (AMBIEN) tablet 10 mg  10 mg Oral QHS PRN    acetaminophen (TYLENOL) tablet 650 mg  650 mg Oral Q4H PRN    ibuprofen (MOTRIN) tablet 400 mg  400 mg Oral Q8H PRN    magnesium hydroxide (MILK OF MAGNESIA) 400 mg/5 mL oral suspension 30 mL  30 mL Oral DAILY PRN    nicotine (NICODERM CQ) 21 mg/24 hr patch 1 Patch  1 Patch TransDERmal DAILY PRN      SCHEDULED MEDICATIONS:   Current Facility-Administered Medications   Medication Dose Route Frequency    amLODIPine (NORVASC) tablet 5 mg  5 mg Oral DAILY          ASSESSMENT & PLAN     DIAGNOSES REQUIRING ACTIVE TREATMENT AND MONITORING: (reviewed/updated 4/30/2018)  Patient Active Hospital Problem List:   Schizophrenia, paranoid ([facility] [address] (4/27/2018)    Assessment: Chronic history with acute exacerbation, present with psychosis, paranoia,hyperverbal,delusional, disorganized thoughts process,homeless,internally preoccupied,poor hygiene. Poor insight. wanderer,travelling from state to state,not on any medications and does note want to be on any medications. Plan: Use prn antipsychotic and benzo for psychotic agitations,aggression              Request court order medications. 4/30- poor insight and does not want medication. req court order med to treat.  Issue with compliance      I will continue to monitor blood levels (Depakote, Tegretol, lithium, clozapine---a drug with a narrow therapeutic index= NTI) and associated labs for drug therapy implemented that require intense monitoring for toxicity as deemed appropriate based on current medication side effects and pharmacodynamically determined drug 1/2 lives. In summary, Jeff Spencer, is a 50 y.o.  male who presents with a severe exacerbation of the principal diagnosis of Schizophrenia, paranoid ([address]  Patient's condition is worsening/not improving/not stable improving. Patient requires continued inpatient hospitalization for further stabilization, safety monitoring and medication management. I will continue to coordinate the provision of individual, milieu, occupational, group, and substance abuse therapies to address target symptoms/diagnoses as deemed appropriate for the individual patient. A coordinated, multidisplinary treatment team round was conducted with the patient (this team consists of the nurse, psychiatric unit pharmcist,  and writer). Complete current electronic health record for patient has been reviewed today including consultant notes, ancillary staff notes, nurses and psychiatric tech notes. Suicide risk assessment completed and patient deemed to be of low risk for suicide at this time. The following regarding medications was addressed during rounds with patient:   the risks and benefits of the proposed medication. The patient was given the opportunity to ask questions. Informed consent given to the use of the above medications. Will continue to adjust psychiatric and non-psychiatric medications (see above \"medication\" section and orders section for details) as deemed appropriate & based upon diagnoses and response to treatment. I will continue to order blood tests/labs and diagnostic tests as deemed appropriate and review results as they become available (see orders for details and above listed lab/test results). I will order psychiatric records from previous [facility] hospitals to further elucidate the nature of patient's psychopathology and review once available.     I will gather additional collateral information from friends, family and o/p treatment team to further elucidate the nature of patient's psychopathology and baselline level of psychiatric functioning. I certify that this patient's inpatient psychiatric hospital services furnished since the previous certification were, and continue to be, required for treatment that could reasonably be expected to improve the patient's condition, or for diagnostic study, and that the patient continues to need, on a daily basis, active treatment furnished directly by or requiring the supervision of inpatient psychiatric facility personnel. In addition, the hospital records show that services furnished were intensive treatment services, admission or related services, or equivalent services.     EXPECTED DISCHARGE DATE/DAY: TBD     DISPOSITION: Home       Signed By:   Stefan Kirk MD  4/30/2018

## 2018-04-30 NOTE — DISCHARGE INSTRUCTIONS
DISCHARGE SUMMARY from Nurse    PATIENT INSTRUCTIONS:  What to do at Home:  Recommended activity: Activity as tolerated  *  Please give a list of your current medications to your Primary Care Provider. *  Please update this list whenever your medications are discontinued, doses are      changed, or new medications (including over-the-counter products) are added. *  Please carry medication information at all times in case of emergency situations. These are general instructions for a healthy lifestyle:    No smoking/ No tobacco products/ Avoid exposure to second hand smoke  Surgeon General's Warning:  Quitting smoking now greatly reduces serious risk to your health. Obesity, smoking, and sedentary lifestyle greatly increases your risk for illness    A healthy diet, regular physical exercise & weight monitoring are important for maintaining a healthy lifestyle    The discharge information has been reviewed with the patient. The patient verbalized understanding. Discharge medications reviewed with the patient and appropriate educational materials and side effects teaching were provided. ___________________________________________________________________________________________________________________________________  If I feel I am at risk of hurting myself or others, I will call the crisis office and speak with a crisis worker who will assist me during my crisis.     [facility]  [phone]  [address] [place] [address] [phone]  [address]-  [phone]

## 2018-04-30 NOTE — PROGRESS NOTES
Problem: Altered Thought Process (Adult/Pediatric)  Goal: *STG: Complies with medication therapy  Outcome: Not Progressing Towards Goal  Pt is compliant with his blood pressure medication. He is reluctant to start any medications for his mental health however. Pt denies any needs. Will continue to monitor pt q15 minutes for safety and support.
